# Patient Record
Sex: FEMALE | Race: WHITE | NOT HISPANIC OR LATINO
[De-identification: names, ages, dates, MRNs, and addresses within clinical notes are randomized per-mention and may not be internally consistent; named-entity substitution may affect disease eponyms.]

---

## 2019-03-12 ENCOUNTER — RESULT REVIEW (OUTPATIENT)
Age: 49
End: 2019-03-12

## 2019-04-18 PROBLEM — Z00.00 ENCOUNTER FOR PREVENTIVE HEALTH EXAMINATION: Status: ACTIVE | Noted: 2019-04-18

## 2019-07-09 ENCOUNTER — APPOINTMENT (OUTPATIENT)
Dept: GASTROENTEROLOGY | Facility: CLINIC | Age: 49
End: 2019-07-09
Payer: COMMERCIAL

## 2019-07-09 VITALS
SYSTOLIC BLOOD PRESSURE: 100 MMHG | RESPIRATION RATE: 15 BRPM | DIASTOLIC BLOOD PRESSURE: 64 MMHG | TEMPERATURE: 97.4 F | HEIGHT: 62 IN | BODY MASS INDEX: 23.92 KG/M2 | OXYGEN SATURATION: 98 % | HEART RATE: 70 BPM | WEIGHT: 130 LBS

## 2019-07-09 DIAGNOSIS — Z87.891 PERSONAL HISTORY OF NICOTINE DEPENDENCE: ICD-10-CM

## 2019-07-09 PROCEDURE — 99244 OFF/OP CNSLTJ NEW/EST MOD 40: CPT

## 2019-07-09 NOTE — PHYSICAL EXAM
[General Appearance - Alert] : alert [General Appearance - In No Acute Distress] : in no acute distress [Extraocular Movements] : extraocular movements were intact [Sclera] : the sclera and conjunctiva were normal [PERRL With Normal Accommodation] : pupils were equal in size, round, and reactive to light [Outer Ear] : the ears and nose were normal in appearance [Oropharynx] : the oropharynx was normal [Neck Appearance] : the appearance of the neck was normal [Neck Cervical Mass (___cm)] : no neck mass was observed [Jugular Venous Distention Increased] : there was no jugular-venous distention [Thyroid Diffuse Enlargement] : the thyroid was not enlarged [Thyroid Nodule] : there were no palpable thyroid nodules [Heart Sounds] : normal S1 and S2 [Heart Rate And Rhythm] : heart rate was normal and rhythm regular [Auscultation Breath Sounds / Voice Sounds] : lungs were clear to auscultation bilaterally [Heart Sounds Gallop] : no gallops [Murmurs] : no murmurs [Heart Sounds Pericardial Friction Rub] : no pericardial rub [Edema] : there was no peripheral edema [Abdomen Tenderness] : non-tender [Bowel Sounds] : normal bowel sounds [Abdomen Soft] : soft [Abdomen Mass (___ Cm)] : no abdominal mass palpated [Cervical Lymph Nodes Enlarged Posterior Bilaterally] : posterior cervical [Axillary Lymph Nodes Enlarged Bilaterally] : axillary [Supraclavicular Lymph Nodes Enlarged Bilaterally] : supraclavicular [Cervical Lymph Nodes Enlarged Anterior Bilaterally] : anterior cervical [Inguinal Lymph Nodes Enlarged Bilaterally] : inguinal [Femoral Lymph Nodes Enlarged Bilaterally] : femoral [No CVA Tenderness] : no ~M costovertebral angle tenderness [Nail Clubbing] : no clubbing  or cyanosis of the fingernails [Abnormal Walk] : normal gait [No Spinal Tenderness] : no spinal tenderness [Skin Color & Pigmentation] : normal skin color and pigmentation [Musculoskeletal - Swelling] : no joint swelling seen [Motor Tone] : muscle strength and tone were normal [] : no rash [Skin Turgor] : normal skin turgor [Oriented To Time, Place, And Person] : oriented to person, place, and time [Impaired Insight] : insight and judgment were intact [Affect] : the affect was normal

## 2019-07-09 NOTE — ASSESSMENT
[FreeTextEntry1] : Ulcerative colitis, rectal biopsy indefinite for dysplasia\par \par Plan\par Indications risks benefits and alternatives to repeat colonoscopy with methylene blue dye spraying, reviewed\par Patient agreeable to examination\par Recommendation for subsequent surveillance examinations based on findings of next procedure\par \par Patient referred by Dr. Jesus Jha

## 2019-07-09 NOTE — REASON FOR VISIT
[Consultation] : a consultation visit [FreeTextEntry1] : Ulcerative colitis\par Biopsy indefinite for dysplasia

## 2019-07-09 NOTE — HISTORY OF PRESENT ILLNESS
[de-identified] : 49-year-old female long-standing history of ulcerative colitis, denies any use of corticosteroids, no use of biologic or immune therapies, generally well maintained on mesalamine preparations, currently Lialda, presents for evaluation and recent abnormal rectal biopsy. Flat tissue, indefinite for dysplasia confirmed by second GI pathology evaluation.\par \par Social history nonsmoker, hairdresser

## 2019-09-09 ENCOUNTER — APPOINTMENT (OUTPATIENT)
Dept: GASTROENTEROLOGY | Facility: HOSPITAL | Age: 49
End: 2019-09-09

## 2019-09-09 ENCOUNTER — OUTPATIENT (OUTPATIENT)
Dept: OUTPATIENT SERVICES | Facility: HOSPITAL | Age: 49
LOS: 1 days | End: 2019-09-09
Payer: COMMERCIAL

## 2019-09-09 ENCOUNTER — RESULT REVIEW (OUTPATIENT)
Age: 49
End: 2019-09-09

## 2019-09-09 DIAGNOSIS — K51.90 ULCERATIVE COLITIS, UNSPECIFIED, WITHOUT COMPLICATIONS: ICD-10-CM

## 2019-09-09 PROCEDURE — 45385 COLONOSCOPY W/LESION REMOVAL: CPT

## 2019-09-09 PROCEDURE — 45380 COLONOSCOPY AND BIOPSY: CPT | Mod: XS

## 2019-09-09 PROCEDURE — 88305 TISSUE EXAM BY PATHOLOGIST: CPT | Mod: 26

## 2019-09-09 PROCEDURE — 88305 TISSUE EXAM BY PATHOLOGIST: CPT

## 2019-09-10 LAB — SURGICAL PATHOLOGY STUDY: SIGNIFICANT CHANGE UP

## 2019-09-12 ENCOUNTER — MOBILE ON CALL (OUTPATIENT)
Age: 49
End: 2019-09-12

## 2019-09-12 ENCOUNTER — MESSAGE (OUTPATIENT)
Age: 49
End: 2019-09-12

## 2020-01-06 ENCOUNTER — MEDICATION RENEWAL (OUTPATIENT)
Age: 50
End: 2020-01-06

## 2020-01-06 DIAGNOSIS — Z85.038 PERSONAL HISTORY OF OTHER MALIGNANT NEOPLASM OF LARGE INTESTINE: ICD-10-CM

## 2020-03-09 ENCOUNTER — APPOINTMENT (OUTPATIENT)
Dept: GASTROENTEROLOGY | Facility: HOSPITAL | Age: 50
End: 2020-03-09

## 2020-03-09 ENCOUNTER — OUTPATIENT (OUTPATIENT)
Dept: OUTPATIENT SERVICES | Facility: HOSPITAL | Age: 50
LOS: 1 days | End: 2020-03-09
Payer: COMMERCIAL

## 2020-03-09 ENCOUNTER — RESULT REVIEW (OUTPATIENT)
Age: 50
End: 2020-03-09

## 2020-03-09 DIAGNOSIS — K51.90 ULCERATIVE COLITIS, UNSPECIFIED, WITHOUT COMPLICATIONS: ICD-10-CM

## 2020-03-09 DIAGNOSIS — Z86.010 PERSONAL HISTORY OF COLONIC POLYPS: ICD-10-CM

## 2020-03-09 PROCEDURE — 88305 TISSUE EXAM BY PATHOLOGIST: CPT

## 2020-03-09 PROCEDURE — 45380 COLONOSCOPY AND BIOPSY: CPT | Mod: XS

## 2020-03-09 PROCEDURE — 45385 COLONOSCOPY W/LESION REMOVAL: CPT

## 2020-03-09 PROCEDURE — 88305 TISSUE EXAM BY PATHOLOGIST: CPT | Mod: 26

## 2020-03-10 LAB — SURGICAL PATHOLOGY STUDY: SIGNIFICANT CHANGE UP

## 2020-09-11 RX ORDER — SODIUM PICOSULFATE, MAGNESIUM OXIDE, AND ANHYDROUS CITRIC ACID 10; 3.5; 12 MG/160ML; G/160ML; G/160ML
10-3.5-12 MG-GM LIQUID ORAL
Qty: 1 | Refills: 0 | Status: DISCONTINUED | COMMUNITY
Start: 2020-02-24 | End: 2020-09-11

## 2020-09-21 DIAGNOSIS — Z12.11 ENCOUNTER FOR SCREENING FOR MALIGNANT NEOPLASM OF COLON: ICD-10-CM

## 2020-09-21 RX ORDER — SODIUM PICOSULFATE, MAGNESIUM OXIDE, AND ANHYDROUS CITRIC ACID 10; 3.5; 12 MG/16.2G; G/16.2G; G/16.2G
10-3.5-12 POWDER, METERED ORAL
Qty: 1 | Refills: 0 | Status: DISCONTINUED | COMMUNITY
Start: 2019-07-09 | End: 2020-09-21

## 2020-11-03 ENCOUNTER — NON-APPOINTMENT (OUTPATIENT)
Age: 50
End: 2020-11-03

## 2020-11-04 DIAGNOSIS — Z01.818 ENCOUNTER FOR OTHER PREPROCEDURAL EXAMINATION: ICD-10-CM

## 2020-11-05 ENCOUNTER — TRANSCRIPTION ENCOUNTER (OUTPATIENT)
Age: 50
End: 2020-11-05

## 2020-11-06 ENCOUNTER — APPOINTMENT (OUTPATIENT)
Dept: DISASTER EMERGENCY | Facility: CLINIC | Age: 50
End: 2020-11-06

## 2020-11-07 LAB — SARS-COV-2 N GENE NPH QL NAA+PROBE: NOT DETECTED

## 2020-11-09 ENCOUNTER — RESULT REVIEW (OUTPATIENT)
Age: 50
End: 2020-11-09

## 2020-11-09 ENCOUNTER — APPOINTMENT (OUTPATIENT)
Dept: GASTROENTEROLOGY | Facility: HOSPITAL | Age: 50
End: 2020-11-09

## 2020-11-09 ENCOUNTER — OUTPATIENT (OUTPATIENT)
Dept: OUTPATIENT SERVICES | Facility: HOSPITAL | Age: 50
LOS: 1 days | End: 2020-11-09
Payer: COMMERCIAL

## 2020-11-09 VITALS
SYSTOLIC BLOOD PRESSURE: 98 MMHG | OXYGEN SATURATION: 100 % | HEIGHT: 62 IN | RESPIRATION RATE: 16 BRPM | HEART RATE: 70 BPM | TEMPERATURE: 97 F | DIASTOLIC BLOOD PRESSURE: 65 MMHG | WEIGHT: 130.07 LBS

## 2020-11-09 VITALS
RESPIRATION RATE: 15 BRPM | OXYGEN SATURATION: 100 % | SYSTOLIC BLOOD PRESSURE: 96 MMHG | HEART RATE: 74 BPM | DIASTOLIC BLOOD PRESSURE: 52 MMHG

## 2020-11-09 DIAGNOSIS — Z86.010 PERSONAL HISTORY OF COLONIC POLYPS: ICD-10-CM

## 2020-11-09 DIAGNOSIS — K51.90 ULCERATIVE COLITIS, UNSPECIFIED, WITHOUT COMPLICATIONS: ICD-10-CM

## 2020-11-09 DIAGNOSIS — Z98.891 HISTORY OF UTERINE SCAR FROM PREVIOUS SURGERY: Chronic | ICD-10-CM

## 2020-11-09 PROCEDURE — 45385 COLONOSCOPY W/LESION REMOVAL: CPT

## 2020-11-09 PROCEDURE — 45380 COLONOSCOPY AND BIOPSY: CPT | Mod: 59

## 2020-11-09 PROCEDURE — 88305 TISSUE EXAM BY PATHOLOGIST: CPT | Mod: 26

## 2020-11-09 PROCEDURE — C1889: CPT

## 2020-11-09 PROCEDURE — 45381 COLONOSCOPY SUBMUCOUS NJX: CPT

## 2020-11-09 PROCEDURE — 88305 TISSUE EXAM BY PATHOLOGIST: CPT

## 2020-11-09 RX ORDER — MESALAMINE 400 MG
4 TABLET, DELAYED RELEASE (ENTERIC COATED) ORAL
Qty: 0 | Refills: 0 | DISCHARGE

## 2020-11-09 RX ORDER — MOXIFLOXACIN HYDROCHLORIDE TABLETS, 400 MG 400 MG/1
1 TABLET, FILM COATED ORAL
Qty: 0 | Refills: 0 | DISCHARGE

## 2020-11-09 RX ORDER — VEDOLIZUMAB 108 MG/.68ML
300 INJECTION, SOLUTION SUBCUTANEOUS
Qty: 0 | Refills: 0 | DISCHARGE

## 2020-11-09 RX ORDER — SODIUM CHLORIDE 9 MG/ML
1000 INJECTION INTRAMUSCULAR; INTRAVENOUS; SUBCUTANEOUS
Refills: 0 | Status: DISCONTINUED | OUTPATIENT
Start: 2020-11-09 | End: 2020-11-23

## 2020-11-09 RX ADMIN — SODIUM CHLORIDE 30 MILLILITER(S): 9 INJECTION INTRAMUSCULAR; INTRAVENOUS; SUBCUTANEOUS at 08:22

## 2020-11-09 NOTE — PRE PROCEDURE NOTE - PRE PROCEDURE EVALUATION
Attending Physician:        Alvaro Pinto MD                    Procedure:    Indication for Procedure: colitis, dysplasia surveillance  ________________________________________________________  PAST MEDICAL & SURGICAL HISTORY:  Colitis    S/P  section      ALLERGIES:  penicillin (Rash)    HOME MEDICATIONS:  Cipro 500 mg oral tablet: 1 tab(s) orally every 12 hours  Entyvio 300 mg intravenous injection: 300 milligram(s) intravenous every 2 months  Lialda 1.2 g oral delayed release tablet: 4 tab(s) orally once a day    AICD/PPM: [ ] yes   [x ] no    PERTINENT LAB DATA:                      PHYSICAL EXAMINATION:    Height (cm): 157.5  Weight (kg): 59  BMI (kg/m2): 23.8  BSA (m2): 1.59T(C): 36.2  HR: 70  BP: 98/65  RR: 16  SpO2: 100%  Vital Signs Last 24 Hrs  T(C): 36.2 (2020 08:02), Max: 36.2 (2020 08:02)  T(F): 97.2 (2020 08:02), Max: 97.2 (2020 08:02)  HR: 70 (2020 08:02) (70 - 70)  BP: 98/65 (2020 08:02) (98/65 - 98/65)  BP(mean): --  RR: 16 (2020 08:02) (16 - 16)  SpO2: 100% (2020 08:02) (100% - 100%)  Constitutional: NAD  HEENT: PERRLA, EOMI,    Neck:  No JVD  Respiratory: CTAB/L  Cardiovascular: S1 and S2  Gastrointestinal: BS+, soft, NT/ND  Extremities: No peripheral edema  Neurological: A/O x 3, no focal deficits  Psychiatric: Normal mood, normal affect  Skin: No rashes    ASA Class: I [ ]  II [x ]  III [ ]  IV [ ]    COMMENTS:    The patient is a suitable candidate for the planned procedure unless box checked [ ]  No, explain:

## 2020-11-10 ENCOUNTER — TRANSCRIPTION ENCOUNTER (OUTPATIENT)
Age: 50
End: 2020-11-10

## 2020-11-11 LAB — SURGICAL PATHOLOGY STUDY: SIGNIFICANT CHANGE UP

## 2020-11-12 ENCOUNTER — NON-APPOINTMENT (OUTPATIENT)
Age: 50
End: 2020-11-12

## 2020-11-12 PROBLEM — K52.9 NONINFECTIVE GASTROENTERITIS AND COLITIS, UNSPECIFIED: Chronic | Status: ACTIVE | Noted: 2020-11-09

## 2020-11-13 ENCOUNTER — NON-APPOINTMENT (OUTPATIENT)
Age: 50
End: 2020-11-13

## 2020-11-17 ENCOUNTER — APPOINTMENT (OUTPATIENT)
Dept: SURGERY | Facility: CLINIC | Age: 50
End: 2020-11-17
Payer: COMMERCIAL

## 2020-11-17 VITALS
RESPIRATION RATE: 16 BRPM | BODY MASS INDEX: 24.23 KG/M2 | HEIGHT: 61.5 IN | SYSTOLIC BLOOD PRESSURE: 134 MMHG | DIASTOLIC BLOOD PRESSURE: 80 MMHG | HEART RATE: 92 BPM | OXYGEN SATURATION: 98 % | TEMPERATURE: 97.6 F | WEIGHT: 130 LBS

## 2020-11-17 PROCEDURE — 99244 OFF/OP CNSLTJ NEW/EST MOD 40: CPT | Mod: 25

## 2020-11-17 PROCEDURE — 46600 DIAGNOSTIC ANOSCOPY SPX: CPT

## 2020-11-17 PROCEDURE — 99072 ADDL SUPL MATRL&STAF TM PHE: CPT

## 2020-11-17 RX ORDER — CHROMIUM 200 MCG
TABLET ORAL
Refills: 0 | Status: ACTIVE | COMMUNITY

## 2020-11-17 RX ORDER — VEDOLIZUMAB 300 MG/5ML
300 INJECTION, POWDER, LYOPHILIZED, FOR SOLUTION INTRAVENOUS
Refills: 0 | Status: ACTIVE | COMMUNITY

## 2020-11-17 RX ORDER — SODIUM SULFATE, POTASSIUM SULFATE, MAGNESIUM SULFATE 17.5; 3.13; 1.6 G/ML; G/ML; G/ML
17.5-3.13-1.6 SOLUTION, CONCENTRATE ORAL
Qty: 1 | Refills: 0 | Status: DISCONTINUED | COMMUNITY
Start: 2020-02-20 | End: 2020-11-17

## 2020-11-17 RX ORDER — SODIUM PICOSULFATE, MAGNESIUM OXIDE, AND ANHYDROUS CITRIC ACID 10; 3.5; 12 MG/160ML; G/160ML; G/160ML
10-3.5-12 MG-GM LIQUID ORAL
Qty: 1 | Refills: 0 | Status: DISCONTINUED | COMMUNITY
Start: 2020-09-21 | End: 2020-11-17

## 2020-11-17 NOTE — ASSESSMENT
[FreeTextEntry1] : I have seen and evaluated patient and I have corroborated all nursing input into this note.  Patient with ulcerative colitis for 20 years and a flat polyp with adenocarcinoma less than 1 mm from the cauterized margin.  A margin this close is an indication for surgery in a patient without ulcerative colitis.  Since this patient has ulcerative colitis she is at high risk for both synchronous and metachronous cancers.  In addition, such cancers can be missed even with chromoendoscopy.  Therefore, a pan proctocolectomy is indicated.  The patient is an excellent candidate for ileal anal J-pouch reconstruction.  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  I recommended the laparoscopic approach for her surgery and reviewed indications, risk, benefits, alternatives including but not limited to average number of bowel movements at approximately 6, pouch failure rate (permanent ileostomy) at approximately 5 to 7% (because of incontinence, infection, fistula, Crohn's disease mimicking ulcerative colitis, and severe chronic pouchitis), temporary ileostomy, and conversion to open surgery.  The patient's  was present for the conversation and all questions were answered.  Patient will need a staging CT scan of the chest abdomen and pelvis prior to surgery.  Today's discussion was not complete because of the volume of new information that I gave to the patient.  Therefore, I will schedule a follow-up telemedicine visit to review further details of surgery and some of the other risks including but not limited to acute pouchitis and dyspareunia, as well as to give the patient and her  another opportunity to raise questions.

## 2020-11-17 NOTE — HISTORY OF PRESENT ILLNESS
[FreeTextEntry1] : Mariana is a 50 year old female here with a hx of UC that was diagnosed 20 years ago. Was on Lialda in the past, now on Entyvio infusions every 8 weeks since May 2020. Reports feeling well & has one formed BM daily. Denies rectal pain or current rectal bleeding. Denies recent weight loss or appetite changes. Her maternal uncle had colon cancer.  Denies history of of anorectal complaints.  No bleeding or pain with bowel movements at this time.  No history of fissures, fistulas or hemorrhoids.  Reports normal continence of gas and stool.  Previous umbilical hernia repair.  Patient believes mesh was used.\par \par Colonoscopy from 11/09/2020 demonstrated Erythematous mucosa from anus to descending colon. One 17 mm polyp at 20 cm proximal to the anus, One suspected sessile 25 mm polyp also at 20 cm proximal to the anus.  Chromoscopy was performed in the entire colon. No additional lesions  revealed\par pathology:\par 1. Colon, cecum, at 20 cm, polyp, biopsy\par - Invasive adenocarcinoma with mucinous feature in background of high-grade and low-grade polypoid dysplasia, see note.\par - The adenocarcinoma is less than 0.1 cm from the cauterized tissue edge. Small vessel lymphatic invasion is identified (focal).\par 2. Colon, 60 cm, biopsy:Colonic mucosa with mild crypt distortion. Negative for granuloma or dysplasia.\par 3. Colon, 50 cm, biopsy: Colonic mucosa with mild crypt distortion.- Negative for granuloma or dysplasia.\par 4. Colon, 40 cm, biopsy:Mild chronic inactive colitis with mild crypt distortion.- Negative for granuloma or dysplasia.\par 5. Colon, 30 cm, biopsy: Colonic mucosa with mild crypt distortion.- Negative for granuloma or dysplasia.\par 6. Colon, 20 cm, irregular mucosa, biopsy- Chronic active colitis with cryptitis and lamina propria\par chronic inflammation.- Negative for granuloma or dysplasia.\par 7. Colon, 10 cm, biopsy- Chronic focal active colitis with crypt distortion. Indefinite for dysplasia (focal colonic crypts).Negative for granulomata.

## 2020-11-17 NOTE — CONSULT LETTER
[Dear  ___] : Dear ~CHAYITO, [Consult Letter:] : I had the pleasure of evaluating your patient, [unfilled]. [Please see my note below.] : Please see my note below. [Consult Closing:] : Thank you very much for allowing me to participate in the care of this patient.  If you have any questions, please do not hesitate to contact me. [Sincerely,] : Sincerely, [FreeTextEntry2] : Dr. Alvaro Pinto [FreeTextEntry3] : Pablo Dhaliwal M.D., CORNEL.ADAN., F.[email or web address].[email or web address].\[facility] Chief Colorectal Clinical Services, [facility] [DrIsrael  ___] : Dr. MCKEON [DrIsrael ___] : Dr. MCKEON

## 2020-11-17 NOTE — PHYSICAL EXAM
[Normal Breath Sounds] : Normal breath sounds [Normal Heart Sounds] : normal heart sounds [Normal Rate and Rhythm] : normal rate and rhythm [No Rash or Lesion] : No rash or lesion [Alert] : alert [Oriented to Person] : oriented to person [Oriented to Place] : oriented to place [Oriented to Time] : oriented to time [Calm] : calm [Abdomen Masses] : No abdominal masses [Abdomen Tenderness] : ~T No ~M abdominal tenderness [JVD] : no jugular venous distention  [de-identified] : UH repair and  scars [de-identified] : Well nourished female, in no apparent distress [de-identified] : WNL [de-identified] : full ROM [FreeTextEntry1] : Perianal inspection digital exam and anoscopy demonstrated a posterior fissure with an associated small skin tag.  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

## 2020-11-18 ENCOUNTER — NON-APPOINTMENT (OUTPATIENT)
Age: 50
End: 2020-11-18

## 2020-11-23 ENCOUNTER — APPOINTMENT (OUTPATIENT)
Dept: CT IMAGING | Facility: IMAGING CENTER | Age: 50
End: 2020-11-23

## 2020-12-23 ENCOUNTER — NON-APPOINTMENT (OUTPATIENT)
Age: 50
End: 2020-12-23

## 2021-04-01 ENCOUNTER — NON-APPOINTMENT (OUTPATIENT)
Age: 51
End: 2021-04-01

## 2021-05-06 ENCOUNTER — APPOINTMENT (OUTPATIENT)
Dept: GASTROENTEROLOGY | Facility: CLINIC | Age: 51
End: 2021-05-06
Payer: COMMERCIAL

## 2021-05-06 DIAGNOSIS — K51.90 ULCERATIVE COLITIS, UNSPECIFIED, W/OUT COMPLICATIONS: ICD-10-CM

## 2021-05-06 DIAGNOSIS — C18.7 MALIGNANT NEOPLASM OF SIGMOID COLON: ICD-10-CM

## 2021-05-06 DIAGNOSIS — K60.2 ANAL FISSURE, UNSPECIFIED: ICD-10-CM

## 2021-05-06 DIAGNOSIS — Z86.010 PERSONAL HISTORY OF COLONIC POLYPS: ICD-10-CM

## 2021-05-06 PROCEDURE — 99072 ADDL SUPL MATRL&STAF TM PHE: CPT

## 2021-05-06 PROCEDURE — 99212 OFFICE O/P EST SF 10 MIN: CPT

## 2021-05-17 RX ORDER — HYDROCORTISONE ACETATE AND PRAMOXINE HYDROCHLORIDE 25; 10 MG/G; MG/G
2.5-1 CREAM TOPICAL
Qty: 1 | Refills: 1 | Status: ACTIVE | COMMUNITY
Start: 2021-05-17 | End: 1900-01-01

## 2021-05-21 ENCOUNTER — APPOINTMENT (OUTPATIENT)
Dept: GASTROENTEROLOGY | Facility: CLINIC | Age: 51
End: 2021-05-21
Payer: COMMERCIAL

## 2021-05-21 VITALS
HEART RATE: 54 BPM | SYSTOLIC BLOOD PRESSURE: 88 MMHG | WEIGHT: 112.13 LBS | TEMPERATURE: 97.7 F | OXYGEN SATURATION: 99 % | BODY MASS INDEX: 20.63 KG/M2 | HEIGHT: 62 IN | DIASTOLIC BLOOD PRESSURE: 56 MMHG

## 2021-05-21 DIAGNOSIS — F32.9 ANXIETY DISORDER, UNSPECIFIED: ICD-10-CM

## 2021-05-21 DIAGNOSIS — F41.9 ANXIETY DISORDER, UNSPECIFIED: ICD-10-CM

## 2021-05-21 DIAGNOSIS — Z80.0 FAMILY HISTORY OF MALIGNANT NEOPLASM OF DIGESTIVE ORGANS: ICD-10-CM

## 2021-05-21 DIAGNOSIS — Z87.19 PERSONAL HISTORY OF OTHER DISEASES OF THE DIGESTIVE SYSTEM: ICD-10-CM

## 2021-05-21 PROCEDURE — 99072 ADDL SUPL MATRL&STAF TM PHE: CPT

## 2021-05-21 PROCEDURE — 99213 OFFICE O/P EST LOW 20 MIN: CPT

## 2021-05-21 RX ORDER — LOPERAMIDE HCL 2 MG
CAPSULE ORAL
Refills: 0 | Status: ACTIVE | COMMUNITY

## 2021-05-21 RX ORDER — DIPHENOXYLATE HCL/ATROPINE 2.5-.025/5
2.5-0.025 LIQUID (ML) ORAL
Refills: 0 | Status: ACTIVE | COMMUNITY

## 2021-05-21 RX ORDER — BUPROPION HYDROCHLORIDE 150 MG/1
150 TABLET, EXTENDED RELEASE ORAL DAILY
Qty: 30 | Refills: 3 | Status: ACTIVE | COMMUNITY
Start: 2021-05-21 | End: 1900-01-01

## 2021-05-21 NOTE — REVIEW OF SYSTEMS
[Feeling Tired] : feeling tired [Negative] : Genitourinary [FreeTextEntry2] : Weight loss noted after surgery. [FreeTextEntry7] : Frequent postprandial bowel movements with perianal burning.

## 2021-05-21 NOTE — HISTORY OF PRESENT ILLNESS
[FreeTextEntry1] : Mrs. Mariana Kraus was seen in the office today.  Patient is a 51-year-old female who had a longstanding history of ulcerative colitis and subsequently developed carcinoma.  She underwent a subtotal colectomy and has now had reversal of the colostomy and has a J-pouch.  She has lost weight during her 2 surgeries but has a well-maintained appetite.  The patient has notable loose bowel movements especially after eating and notices pain and burning in the perianal area.  There is no rectal bleeding.  She takes Lomotil and Imodium, the amount of Lomotil she takes is limited by the fact it could make her feel tired and dizzy.  She does not abuse tobacco caffeine or ethanol.

## 2021-05-21 NOTE — ASSESSMENT
[FreeTextEntry1] : Mrs. Kraus is a 51-year-old female with a history of chronic ulcerative colitis.  Patient developed carcinoma and subsequently had a subtotal colectomy and now has reversal of the J-pouch.  The patient has frequent loose bowel movements and notices perianal burning and pain in the anal area.  She does have an anal fissure.  The patient was told to use Lomotil more liberally if she tolerates the side effects of drowsiness.  The patient will continue to use local steroid creams to the anal fissure and barrier cream to the skin.  She admits to being depressed after her current issues and had been on Wellbutrin in the past.  I see no reason why she cannot continue this medication and I took the liberty of prescribing it.  She will get back to me in several weeks with a progress report.  She was told to maintain her nutritional status by using supplements as tolerated.

## 2021-05-21 NOTE — PHYSICAL EXAM
[General Appearance - Alert] : alert [General Appearance - In No Acute Distress] : in no acute distress [General Appearance - Well Nourished] : well nourished [Respiration, Rhythm And Depth] : normal respiratory rhythm and effort [Auscultation Breath Sounds / Voice Sounds] : lungs were clear to auscultation bilaterally [Apical Impulse] : the apical impulse was normal [Heart Sounds] : normal S1 and S2 [Heart Rate And Rhythm] : heart rate was normal and rhythm regular [Heart Sounds Gallop] : no gallops [Bowel Sounds] : normal bowel sounds [Abdomen Tenderness] : non-tender [] : no hepato-splenomegaly [FreeTextEntry1] : The perianal skin was not excoriated.  There was a fissure in anal the finger was not inserted due to patient discomfort.

## 2021-06-04 PROBLEM — K60.2 ANAL FISSURE: Status: ACTIVE | Noted: 2021-05-17

## 2021-06-04 PROBLEM — Z86.010 HISTORY OF ADENOMATOUS POLYP OF COLON: Status: ACTIVE | Noted: 2020-01-06

## 2021-06-04 PROBLEM — K51.90 CHRONIC ULCERATIVE COLITIS: Status: ACTIVE | Noted: 2019-07-09

## 2021-06-04 PROBLEM — C18.7 MALIGNANT NEOPLASM OF SIGMOID COLON: Status: ACTIVE | Noted: 2020-11-17

## 2022-09-22 NOTE — ASU DISCHARGE PLAN (ADULT/PEDIATRIC) - PATIENT EDUCATION MATERIALS PROVIED
Patient is here with mom, Nuha.    Patient is requesting a school excuse.    If any information or results need to be relayed from today's visit, the best way to contact the patient is via [phone] (mom)Patient gives verbal permission to leave a detailed voicemail at the number provided.       Provider pre-printed instructions given

## 2022-11-14 ENCOUNTER — NON-APPOINTMENT (OUTPATIENT)
Age: 52
End: 2022-11-14

## 2023-06-06 ENCOUNTER — NON-APPOINTMENT (OUTPATIENT)
Age: 53
End: 2023-06-06